# Patient Record
Sex: FEMALE
[De-identification: names, ages, dates, MRNs, and addresses within clinical notes are randomized per-mention and may not be internally consistent; named-entity substitution may affect disease eponyms.]

---

## 2020-06-12 ENCOUNTER — HOSPITAL ENCOUNTER (INPATIENT)
Dept: HOSPITAL 36 - GERO | Age: 85
LOS: 10 days | Discharge: NURSING, REHAB OR LONG-TERM CARE | DRG: 884 | End: 2020-06-22
Attending: PSYCHIATRY & NEUROLOGY | Admitting: PSYCHIATRY & NEUROLOGY
Payer: MEDICARE

## 2020-06-12 ENCOUNTER — HOSPITAL ENCOUNTER (EMERGENCY)
Dept: HOSPITAL 4 - SED | Age: 85
Discharge: PSYCHIATRIC OR SUBSTANCE USE FACILITY | End: 2020-06-12
Payer: COMMERCIAL

## 2020-06-12 VITALS — HEIGHT: 64 IN | BODY MASS INDEX: 26.46 KG/M2 | WEIGHT: 155 LBS

## 2020-06-12 VITALS — DIASTOLIC BLOOD PRESSURE: 73 MMHG | SYSTOLIC BLOOD PRESSURE: 157 MMHG

## 2020-06-12 VITALS — SYSTOLIC BLOOD PRESSURE: 143 MMHG

## 2020-06-12 VITALS — SYSTOLIC BLOOD PRESSURE: 152 MMHG

## 2020-06-12 DIAGNOSIS — F03.91: Primary | ICD-10-CM

## 2020-06-12 DIAGNOSIS — E78.5: ICD-10-CM

## 2020-06-12 DIAGNOSIS — F03.90: Primary | ICD-10-CM

## 2020-06-12 DIAGNOSIS — I10: ICD-10-CM

## 2020-06-12 DIAGNOSIS — F29: ICD-10-CM

## 2020-06-12 LAB
ALBUMIN SERPL BCP-MCNC: 3.5 G/DL (ref 3.4–4.8)
ALT SERPL W P-5'-P-CCNC: 20 U/L (ref 12–78)
AMPHETAMINES UR QL SCN: NEGATIVE
ANION GAP SERPL CALCULATED.3IONS-SCNC: 6 MMOL/L (ref 5–15)
APAP SERPL-MCNC: < 1 UG/ML (ref 1–30)
APPEARANCE UR: (no result)
AST SERPL W P-5'-P-CCNC: 11 U/L (ref 10–37)
BACTERIA URNS QL MICRO: (no result) /HPF
BARBITURATES UR QL SCN: NEGATIVE
BASOPHILS # BLD AUTO: 0.1 K/UL (ref 0–0.2)
BASOPHILS NFR BLD AUTO: 2 % (ref 0–2)
BENZODIAZ UR QL SCN: NEGATIVE
BILIRUB SERPL-MCNC: 0.3 MG/DL (ref 0–1)
BILIRUB UR QL STRIP: NEGATIVE
BUN SERPL-MCNC: 20 MG/DL (ref 8–21)
BZE UR QL SCN: NEGATIVE
CALCIUM SERPL-MCNC: 9.2 MG/DL (ref 8.4–11)
CANNABINOIDS UR QL SCN: NEGATIVE
CHLORIDE SERPL-SCNC: 107 MMOL/L (ref 98–107)
COLOR UR: YELLOW
CREAT SERPL-MCNC: 0.95 MG/DL (ref 0.55–1.3)
EOSINOPHIL # BLD AUTO: 0.1 K/UL (ref 0–0.4)
EOSINOPHIL NFR BLD AUTO: 1.2 % (ref 0–4)
ERYTHROCYTE [DISTWIDTH] IN BLOOD BY AUTOMATED COUNT: 15.1 % (ref 9–15)
ETHANOL SERPL-MCNC: < 3 MG/DL (ref ?–10)
GFR SERPL CREATININE-BSD FRML MDRD: (no result) ML/MIN (ref 90–?)
GLUCOSE SERPL-MCNC: 119 MG/DL (ref 70–99)
GLUCOSE UR STRIP-MCNC: NEGATIVE MG/DL
HCT VFR BLD AUTO: 40 % (ref 36–48)
HGB BLD-MCNC: 13.1 G/DL (ref 12–16)
HGB UR QL STRIP: NEGATIVE
KETONES UR STRIP-MCNC: (no result) MG/DL
LEUKOCYTE ESTERASE UR QL STRIP: (no result)
LYMPHOCYTES # BLD AUTO: 1.7 K/UL (ref 1–5.5)
LYMPHOCYTES NFR BLD AUTO: 24.7 % (ref 20.5–51.5)
MCH RBC QN AUTO: 26 PG (ref 27–31)
MCHC RBC AUTO-ENTMCNC: 33 % (ref 32–36)
MCV RBC AUTO: 81 FL (ref 79–98)
METHADONE UR-SCNC: NEGATIVE UMOL/L
METHAMPHET UR-SCNC: NEGATIVE UMOL/L
MONOCYTES # BLD MANUAL: 0.4 K/UL (ref 0–1)
MONOCYTES # BLD MANUAL: 5.4 % (ref 1.7–9.3)
NEUTROPHILS # BLD AUTO: 4.6 K/UL (ref 1.8–7.7)
NEUTROPHILS NFR BLD AUTO: 66.7 % (ref 40–70)
NITRITE UR QL STRIP: NEGATIVE
OPIATES UR QL SCN: NEGATIVE
OXYCODONE SERPL-MCNC: NEGATIVE NG/ML
PCP UR QL SCN: NEGATIVE
PH UR STRIP: 6.5 [PH] (ref 5–8)
PLATELET # BLD AUTO: 244 K/UL (ref 130–430)
POTASSIUM SERPL-SCNC: 3.9 MMOL/L (ref 3.5–5.1)
PROT UR QL STRIP: NEGATIVE
RBC # BLD AUTO: 4.98 MIL/UL (ref 4.2–6.2)
RBC #/AREA URNS HPF: (no result) /HPF (ref 0–3)
SODIUM SERPLBLD-SCNC: 142 MMOL/L (ref 136–145)
SP GR UR STRIP: 1.02 (ref 1–1.03)
TRICYCLICS UR-MCNC: NEGATIVE NG/ML
URINE PROPOXYPHENE SCREEN: NEGATIVE
UROBILINOGEN UR STRIP-MCNC: 0.2 MG/DL (ref 0.2–1)
WBC # BLD AUTO: 6.9 K/UL (ref 4.8–10.8)
WBC #/AREA URNS HPF: (no result) /HPF (ref 0–3)

## 2020-06-12 PROCEDURE — 80053 COMPREHEN METABOLIC PANEL: CPT

## 2020-06-12 PROCEDURE — 85025 COMPLETE CBC W/AUTO DIFF WBC: CPT

## 2020-06-12 PROCEDURE — 83036 HEMOGLOBIN GLYCOSYLATED A1C: CPT

## 2020-06-12 PROCEDURE — G0480 DRUG TEST DEF 1-7 CLASSES: HCPCS

## 2020-06-12 PROCEDURE — 93005 ELECTROCARDIOGRAM TRACING: CPT

## 2020-06-12 PROCEDURE — Z7610: HCPCS

## 2020-06-12 PROCEDURE — 36415 COLL VENOUS BLD VENIPUNCTURE: CPT

## 2020-06-12 PROCEDURE — 80307 DRUG TEST PRSMV CHEM ANLYZR: CPT

## 2020-06-12 PROCEDURE — G0481 DRUG TEST DEF 8-14 CLASSES: HCPCS

## 2020-06-12 PROCEDURE — G0410 GRP PSYCH PARTIAL HOSP 45-50: HCPCS

## 2020-06-12 PROCEDURE — 99285 EMERGENCY DEPT VISIT HI MDM: CPT

## 2020-06-12 PROCEDURE — G0482 DRUG TEST DEF 15-21 CLASSES: HCPCS

## 2020-06-12 PROCEDURE — 71045 X-RAY EXAM CHEST 1 VIEW: CPT

## 2020-06-12 PROCEDURE — 81000 URINALYSIS NONAUTO W/SCOPE: CPT

## 2020-06-12 PROCEDURE — 87081 CULTURE SCREEN ONLY: CPT

## 2020-06-12 PROCEDURE — 87086 URINE CULTURE/COLONY COUNT: CPT

## 2020-06-12 NOTE — NUR
Patient to be transferred to [place].  Is being transferred due to higher 
level of care.  Receiving facility has accepting physician and available space. 
ER physician has signed transfer form.  Patient or responsible party has agreed 
to transfer and signed form.  Patient belongings inventoried and will be sent 
with patient.  Copy of nursing notes, lab reports, EKG, Physicians Orders and 
X-rays to be sent with patient.  Report called to Iliana at receiving 
facility. Receiving physician is Dr Matthews. [facility] ambulance service has been 
called for transfer.

## 2020-06-12 NOTE — NUR
Pt came to ER for med clearance to be sent to [place]. Pt is in room resting 
comfortable, no distress, VSS.

## 2020-06-13 LAB
CHOLEST SERPL-MCNC: 313 MG/DL (ref ?–200)
HDLC SERPL-MCNC: 49 MG/DL (ref 23–92)
TRIGL SERPL-MCNC: 216 MG/DL (ref ?–150)

## 2020-06-13 NOTE — PSYCHIATRIC EVALUATION
DATE OF SERVICE:  06/13/2020



IDENTIFYING DATA:  The patient is an 85-year-old  woman living by

herself.  Information obtained by directly interviewing the patient as well as

reviewing the admission papers.



JUSTIFICATION OF HOSPITALIZATION:  The patient is admitted on a voluntary basis

in view of her acute psychosis, confusion and disorientation.



CHIEF COMPLAINT:  "I don't know, they brought me in here."



HISTORY OF PRESENT ILLNESS:  This is the first psychiatric hospitalization for

this patient who is reported to have been medicated with 5 mg 3 times a day of

the diazepam and 50 mg of the Seroquel at night time.  The patient is reporting

that she has been feeling very dizzy and has been losing control and has been

forgetting a lot.  The patient during the interview has been repeating the same

thing and has been very confused.  The patient has been having difficult time to

recall what type of work she was doing and then who she was living with, that

tells me that most of her family is in [place] in [place].  Sleep and appetite

prior to the hospitalization are reported to be fair.



PAST PSYCHIATRIC HISTORY:  The patient denies any prior psychiatric

hospitalizations or treatment.



MEDICAL HISTORY:  Physical examination is requested, done by Dr. Prasad.



SUBSTANCE ABUSE HISTORY:  None.



SOCIAL HISTORY:  The patient is living by herself.



PHYSICAL OR SEXUAL ABUSE HISTORY:  None.



LEGAL PROBLEMS:  None at this time.



MENTAL STATUS EXAMINATION:  The patient is an 85-year-old woman looking her

stated age, cooperative.  Eye contact is fair.  Speech is noted to be coherent,

but the patient goes on a tangent and the patient is acutely anxious, confused

and disoriented.  The patient is aware that she is in the hospital, but could

not figure it out why and how long she has been in here.  Attention span and

concentration are noted to be poor at this time.  Short-term memory is noted to

be very poor.  Long-term memory seems to be fair.  The patient is stating that

she is working with the  and could not figure it out where they have gone.

 The patient is not suicidal or homicidal at this time, but the patient has

paranoia, but denies any command hallucinations.  No visual hallucinations are

noted.  The patient appears to be of average intelligence.



DIAGNOSTIC IMPRESSION:

AXIS I:  Psychotic disorder, not otherwise specified.

AXIS IB:  Dementia and behavioral changes secondary trait.

AXIS II:  None.

AXIS III:  Hyperlipidemia, hypertension.



IMMEDIATE TREATMENT PLAN:  The patient's Seroquel is going to be decreased from

50 mg to 25 mg and the diazepam is going to be decreased from 5 mg 3 times a day

to 2 mg twice a day and the patient is going to be closely monitored.  I

encouraged her to verbalize the concerns rather than to act out.  Once

stabilized, the patient is going to be discharged to the skilled nursing

facility.





DD: 06/13/2020 09:24

DT: 06/13/2020 14:49

[facility]# [id number]  [id number]

## 2020-06-13 NOTE — HISTORY AND PHYSICAL
History of Present Illness





- HPI


Chief Complaint: 





Psychosis


HPI: 





* Patient transferred from [facility]


* Diagnoses with Acute Psychosis due to Advanced Dementia


Vital Signs: 





 Last Vital Signs











Temp  97.9 F   06/13/20 05:53


 


Pulse  62   06/13/20 08:27


 


Resp  18   06/13/20 05:53


 


BP  140/74   06/13/20 08:27


 


Pulse Ox  97   06/13/20 05:53














Past Medical History


Cardiovascular: Report: HTN, Hyperlipidemia


Pulmonary: Report: No Pertinent Hx


CNS: Report: Dementia


GI: Report: No Pertinent Hx


Psych: Report: Psychosis


Musculoskeletal: Report: No Pain


Rheumatologic: Report: No pertinent Hx


Infectious Disease: Report: No Pertinent Hx


Renal/: Report: No Pertinent Hx


Endocrine: Report: No Pertinent Hx


Dermatology: Report: No Pertinent Hx





- Past Surgical History


Past Surgical History: No pertinent Hx





Family Medical History





- Family Member


  ** Mother


History Unknown: Yes





Social History


Smoke: No


Alcohol: None


Drugs: None


Lives: Nursing Home





- Medications


Home Medications: 


Home Medication











 Medication  Instructions  Recorded  Type


 


Atorvastatin Calcium [Lipitor] PO HS 06/12/20 History


 


Diazepam [Valium] 5 mg PO TID 06/12/20 History


 


Losartan Potassium 50 mg PO 06/12/20 History


 


Metoprolol Tartrate [Lopressor] 50 mg PO Q12HR 06/12/20 History


 


QUEtiapine Fumarate [SEROquel] 50 mg PO HS 06/12/20 History














- Allergies


Allergies/Adverse Reactions: 


 Allergies











Allergy/AdvReac Type Severity Reaction Status Date / Time


 


No Known Allergies Allergy   Unverified 06/12/20 17:48














Review of Systems





- Review of Systems


Constitutional: Report: No Significant


Eyes: Report: No Significant


ENT: Report: No Significant


Respiratory: Report: No Significant


Cardiovascular: Report: No Significant


Gastrointestinal: Report: No Significant


Genitourinary: Report: No Significant


Musculoskeletal: Report: No Significant


Skin: Report: No Significant


Neurological: Report: No Significant





Physical Exam





- Physical Exam


HEENT: Report: Ears Nose Throat within normal limits, Pharnyx within normal 

limits


Neck: Report: Within normal limits


Cardiovascular Systems: Report: Regular, Rate and Rhythm, no murmurs noted


Respiratory: Report: Clear to Auscultation of lung fields, Breath Sounds are 

within normal limits


Abdomen: Report: Non-tender to palpation, Bowel Sounds are within normal limits


Back: Report: Inspection of back is within normal limits.


Extremities: Report: Non-tender to palpation., Patient had full range of motion

, No pedal edema was noted on inspection


Skin: Report: Color of skin is within normal limits, Warm, Dry, No Rashes noted 

of the skin


Neuro/Psych: Report: CN II-XII intact, No motor deficit, No sensory deficit, No 

new focal deficits





- Lab Results


All Lab Results last 24 hours: 





 Laboratory Results - last 24 hr











  06/12/20





  12:11


 


Triglycerides  216 H


 


Cholesterol  313 H


 


LDL Cholesterol Direct  216 H


 


HDL Cholesterol  49














- Assessment


Assessment: 





* Acute Psychosis


* Dementia


* Hypertension


* Hyperlipidemia





- Plan


Plan: 





* Admitted to Geropsych Unit


* Continue home meds


* Obtain Psych Consult


* Obtain labs





Cranial Nerve Assessment





- CRANIAL NERVES


alcohol swab:: Yes


Distinguishes movements in peripheral field.:: Yes


up, down, sideways:: Yes


on forehead, cheeks and chin, chews symmetrically:: Yes


FACIAL VII: upper: Frowns Symmetrically:: Yes


FACIAL VII: Lower: Smiles Symmetrically:: Yes


both ears:: Yes


GLOSS-PHARYNGEAL IX: Has gag reflex:: Yes


VAGUS X: Can make guttural sounds:: Yes


ACCESSORY XI: Shrugs shoulders symmetrically:: Yes


tremors or fasciculation's:: Yes





- MOTOR


spasticity, cogwheel, atrophy, tremor, asterixis, other: Yes





- COORDINATION


Finger to nose, heel to shin, THA, gait, Romberg: Yes





- SENSORY


signs, Brudzinski, Kernig, neck rigidity:: Yes





- REFLEXES


Brachioradials Right:: Yes


Brachioradials Left:: Yes


Biceps Right:: Yes


Biceps Left:: Yes


Triceps Right:: Yes


Triceps Left:: Yes


Knee Right:: Yes


Knee Left:: Yes


Ankle Right:: Yes


Ankle Left:: Yes


Babinski Right:: No


Babinski Left:: No

## 2020-06-14 LAB
ALT SERPL-CCNC: 17 U/L (ref 12–78)
ANION GAP SERPL CALC-SCNC: 10 MMOL/L (ref 5–15)
AST SERPL-CCNC: 12 U/L (ref 10–37)
BASOPHILS # BLD AUTO: 0 TH/CUMM (ref 0–0.2)
BASOPHILS NFR BLD AUTO: 0.4 % (ref 0–2)
BILIRUB SERPL-MCNC: 0.6 MG/DL (ref 0–1)
BUN SERPL-MCNC: 20 MG/DL (ref 8–21)
CALCIUM SERPL-MCNC: 9.6 MG/DL (ref 8.4–10.2)
CHLORIDE SERPL-SCNC: 105 MMOL/L (ref 98–107)
CO2 SERPL-SCNC: 30 MMOL/L (ref 23–29)
CREAT SERPL-MCNC: 0.84 MG/DL (ref 0.7–1.3)
EOSINOPHIL # BLD AUTO: 0.1 TH/CMM (ref 0.1–0.4)
EOSINOPHIL NFR BLD AUTO: 1.6 % (ref 0–5)
ERYTHROCYTE [DISTWIDTH] IN BLOOD BY AUTOMATED COUNT: 15 % (ref 11.5–20)
HCT VFR BLD CALC: 43.3 % (ref 41–60)
HGB BLD-MCNC: 13.8 GM/DL (ref 12–16)
LYMPHOCYTE AB SER FC-ACNC: 2.7 TH/CMM (ref 1.5–3)
LYMPHOCYTES NFR BLD AUTO: 38.9 % (ref 20–50)
MCH RBC QN AUTO: 25.9 PG (ref 27–31)
MCHC RBC AUTO-ENTMCNC: 31.9 PG (ref 28–36)
MCV RBC AUTO: 81.1 FL (ref 81–100)
MONOCYTES # BLD AUTO: 0.3 TH/CMM (ref 0.3–1)
MONOCYTES NFR BLD AUTO: 3.7 % (ref 2–10)
NEUTROPHILS # BLD: 3.8 TH/CMM (ref 1.8–8)
NEUTROPHILS NFR BLD AUTO: 55.4 % (ref 40–80)
PLATELET # BLD: 255 TH/CMM (ref 150–400)
POTASSIUM SERPL-SCNC: 4.1 MMOL/L (ref 3.5–5.1)
RBC # BLD AUTO: 5.34 MIL/UL (ref 4.2–6.2)
WBC # BLD AUTO: 6.9 K/UL (ref 4.8–10.8)

## 2020-06-14 NOTE — PROGRESS NOTES
DATE:  06/14/2020



SUBJECTIVE:  Staff was spoken to.  The patient is interviewed.  Mood is noted to

be depressed.  Affect is constricted.  The patient is very tearful and crying

this morning.  Insight and judgment at this time are noted to be still impaired.

 Impulse control is noted to be limited.  Coping skills are noted to be limited.

 The patient has been having difficult time to cope with the stress.  No side

effects to the medications are noted.  The patient has been presenting with

anxiety and is stating that she does not have any family over here.



ASSESSMENT:  The patient is still depressed and demented.



PLAN:  To continue the patient with the supportive therapy and start the patient

with the Lexapro 5 mg in the morning for her depression and closely monitor the

patient.





DD: 06/14/2020 10:42

DT: 06/14/2020 16:43

JOB# [id number]  [id number]

## 2020-06-14 NOTE — CONSULTATION
DATE OF CONSULTATION:     06/14/2020



TYPE OF CONSULTATION:  Psychology



HISTORY OF PRESENT ILLNESS:  The patient is an 85-year-old  

female.  The following is by review of the record as well as by the patient's

self-report.  The patient is being admitted on a voluntary basis due to

psychosis, confusion and disorientation.  According to record review, the

patient lives by herself; however, the record indicates the patient is . 

Upon interview, the patient states that she does not know why she was brought

into the hospital.  The patient does report that she had been getting dizzy. 

Staff reports the patient is confused and forgetful.  During the interview, the

patient continued to repeat the same response to different clinical questions.

She denied any suicidal ideation, plan or intention.



PAST MEDICAL HISTORY:  Please see history and physical by Dr. Prasad.



PAST PSYCHIATRIC HISTORY:  Records are unavailable.  Details are unknown.



SUBSTANCE ABUSE HISTORY:  The patient denied any history of alcohol, tobacco, or

drug use.



BRIEF PSYCHOSOCIAL HISTORY:  According to the patient, she is ; however,

the patient was unable to provide any information as to where she lives or who

she lives with.  The patient did not answer questions about having any children

or grandchildren.  The patient states that she is retired.  She did not answer

the questions about her previous occupational history or educational history. 

The patient did not answer questions about history of physical or sexual abuse

or current legal problems.  The patient states that she is a [employer], but not

devout.



MENTAL STATUS EXAMINATION:  The patient appears to be older than her stated 

age.  The patient's attitude is cooperative.  Eye contact is fair.  The patient 
is

reaching out with her hand touching or attempting to touch this writer.  Speech

is slow and soft.  The patient is responding somewhat coherently to the clinical

questions, but is confused.  Thought process shows to be tangential.  Mood is

euthymic.  Affect is broad.  The patient denies any suicidal ideation, plan or

intention.  She denied any auditory or visual hallucinations or any delusions. 

The patient does not know why she is being hospitalized.  The patient's behavior

has been redirectable.  Impulse control is intact.  Concentration is poor.  The

patient is unable to sustain focus and attention.  Short term memory is poor. 

Long-term memory seems to be poor.  Sensorium is alert and oriented to self and

place only.  The patient seems to be of average intelligence.  There are

apparent cognitive deficits with respect to memory and ability to concentrate. 

The patient is distractible.  The patient did not participate in the

interpretation of proverbs.  Insight is poor.  Judgment is compromised.



DIAGNOSTIC IMPRESSION:

AXIS I:

1.  Provisional diagnosis of psychotic disorder, not otherwise specified.

2.  Provisional diagnosis of dementia with behavioral disturbance.

AXIS II:  Deferred.

AXIS III:  Per Dr. Prasad.



TREATMENT PLAN:  The patient has been seen by Dr. Wynne for psychiatric

evaluation and for the management of the patient's psychotropic medications. 

According to the attending psychiatrist, the patient's Seroquel is being

decreased from 50 mg to 25 mg and the diazepam is going to be decreased from 5

mg 3 times a day to 2 mg a day.  We will provide reality orientation,

differentiation and integration.  We will provide coping strategies for phase of

life issues.  We will provide motivational enhancement for the patient to become

compliant and stay compliant with all aspects of her care and treatment.  The

patient's placement will be discussed with the attending psychiatrist, case

manager and the family.  Of note, the patient is being encouraged for possible

placement in a skilled nursing facility versus returning home.  We will follow

up in 2-3 days to continue the present treatment if the patient remains admitted

on the unit and is able to benefit from psychology services.



Thank you, Dr. Wynne for this consult and the opportunity to participate

with you in this patient's care.





DD: 06/14/2020 11:38

DT: 06/14/2020 12:13

JOB# [id number]  [id number]

LETI

## 2020-06-15 RX ADMIN — ESCITALOPRAM SCH MG: 5 TABLET, FILM COATED ORAL at 08:34

## 2020-06-15 NOTE — PROGRESS NOTES
DATE:  06/15/2020



SUBJECTIVE:  Staff was spoken to.  The patient is interviewed.  Mood is noted to

be anxious and depressed.  Affect is constricted.  Insight and judgment at this

time are noted to be still impaired.  Impulse control is noted to be limited. 

Coping skills are noted to be limited.  The patient has been having difficult

time to cope with the stress.  The patient was initially on 5 mg 3 times a day

of Valium.  I am trying to slowly taper the Valium; although the patient is

currently on 2 mg in the morning.  No side effects are noted.  The patient is

still depressed and confused.  The patient is currently placed on Lexapro.  The

patient has been able to tolerate the medications.  The patient has no place to

return to.  The patient is also very forgetful at this time.



PLAN:  To continue the patient with the supportive therapy and continue current

medications.





DD: 06/15/2020 08:26

DT: 06/15/2020 11:45

JOB# [id number]  [id number]

## 2020-06-15 NOTE — INTERNAL MEDICINE PROG NOTE
Internal Medicine Subjective





- Subjective


Service Date: 06/15/20


Patient seen and examined:: without staff


Patient is:: awake


Per staff patient has:: no adverse event, no episodes of fall





Internal Medicine Objective





- Results


Result Diagrams: 


 06/14/20 09:49





 06/14/20 09:49


Recent Labs: 


 Laboratory Last Values











WBC  6.9 K/uL (4.8-10.8)   06/14/20  09:49    


 


RBC  5.34 MIL/uL (4.2-6.2)   06/14/20  09:49    


 


Hgb  13.8 gm/dL (12-16)   06/14/20  09:49    


 


Hct  43.3 % (41.0-60)   06/14/20  09:49    


 


MCV  81.1 fl ()   06/14/20  09:49    


 


MCH  25.9 pg (27.0-31.0)  L  06/14/20  09:49    


 


MCHC Differential  31.9 pg (28.0-36.0)   06/14/20  09:49    


 


RDW  15.0 % (11.5-20.0)   06/14/20  09:49    


 


Plt Count  255 Th/cmm (150-400)   06/14/20  09:49    


 


MPV  8.4 fl  06/14/20  09:49    


 


Neutrophils %  55.4 % (40.0-80.0)   06/14/20  09:49    


 


Lymphocytes %  38.9 % (20.0-50.0)   06/14/20  09:49    


 


Monocytes %  3.7 % (2.0-10.0)   06/14/20  09:49    


 


Eosinophils %  1.6 % (0.0-5.0)   06/14/20  09:49    


 


Basophils %  0.4 % (0.0-2.0)   06/14/20  09:49    


 


Sodium  141 mmol/L (136-145)   06/14/20  09:49    


 


Potassium  4.1 mmol/L (3.5-5.1)   06/14/20  09:49    


 


Chloride  105 mmol/L ()   06/14/20  09:49    


 


Carbon Dioxide  30 mmol/L (23-29)  H  06/14/20  09:49    


 


Anion Gap  10  (5-15)   06/14/20  09:49    


 


BUN  20 mg/dL (8-21)   06/14/20  09:49    


 


Creatinine  0.84 mg/dL (0.70-1.30)   06/14/20  09:49    


 


Glucose  134 mg/dL (70-99)  H  06/14/20  09:49    


 


Calcium  9.6 mg/dL (8.4-10.2)   06/14/20  09:49    


 


Total Bilirubin  0.6 mg/dL (0.0-1.0)   06/14/20  09:49    


 


AST  12 U/L (10-37)   06/14/20  09:49    


 


ALT  17 U/L (12-78)   06/14/20  09:49    


 


Alkaline Phosphatase  69 U/L ()   06/14/20  09:49    


 


Total Protein  7.2 g/dL (6.4-8.3)   06/14/20  09:49    


 


Albumin  3.7 g/dL (3.4-5.0)   06/14/20  09:49    


 


Triglycerides  216 mg/dL (<150)  H  06/12/20  12:11    


 


Cholesterol  313 mg/dL (<200)  H  06/12/20  12:11    


 


LDL Cholesterol Direct  216 mg/dL ()  H  06/12/20  12:11    


 


HDL Cholesterol  49 mg/dL (23-92)   06/12/20  12:11    














- Physical Exam


Vitals and I&O: 


 Vital Signs











Temp  97.5 F   06/15/20 06:41


 


Pulse  65   06/15/20 08:31


 


Resp  19   06/15/20 06:41


 


BP  134/70   06/15/20 08:31


 


Pulse Ox  97   06/15/20 06:41








 Intake & Output











 06/14/20 06/15/20 06/15/20





 18:59 06:59 18:59


 


Intake Total  120 


 


Balance  120 


 


Intake:   


 


  Oral  120 


 


Other:   


 


  # Voids  1 


 


  # Bowel Movements  0 











Active Medications: 


Current Medications





Acetaminophen (Tylenol)  650 mg PO Q4H PRN


   PRN Reason: Pain (Mild 1-3)


   Stop: 08/11/20 17:51


Al Hydrox/Mg Hydrox/Simethicone (Maalox)  30 ml PO Q4HR PRN


   PRN Reason: GI DISTRESS


   Stop: 08/11/20 17:51


Atorvastatin Calcium (Lipitor)  20 mg PO HS HOWARD


   Stop: 08/11/20 20:59


   Last Admin: 06/14/20 21:02 Dose:  20 mg


Diazepam (Valium)  2 mg PO DAILY HOWARD; Protocol


   Stop: 08/14/20 08:59


   Last Admin: 06/15/20 08:34 Dose:  2 mg


Escitalopram Oxalate (Lexapro)  5 mg PO DAILY HOWARD; Protocol


   Stop: 08/14/20 08:59


   Last Admin: 06/15/20 08:34 Dose:  5 mg


Lorazepam (Ativan)  0.5 mg PO Q4HR PRN; Protocol


   PRN Reason: Anxiety


   Stop: 07/12/20 17:51


Losartan Potassium (Cozaar)  50 mg PO DAILY HOWARD


   Stop: 08/12/20 08:59


   Last Admin: 06/15/20 08:29 Dose:  50 mg


Magnesium Hydroxide (Milk Of Magnesia)  30 ml PO HS PRN


   PRN Reason: Constipation


Metoprolol Tartrate (Lopressor)  50 mg PO Q12HR HOWARD


   Stop: 08/11/20 20:59


   Last Admin: 06/15/20 08:31 Dose:  50 mg


Multivitamins/Vitamin C (Theragran)  1 tab PO DAILY HOWARD


   Stop: 08/12/20 08:59


   Last Admin: 06/15/20 08:32 Dose:  1 tab


Quetiapine Fumarate (Seroquel)  25 mg PO HS HOWARD; Protocol


   Stop: 08/11/20 20:59


   Last Admin: 06/14/20 21:03 Dose:  25 mg








General: weak


HEENT: NC/AT, PERRLA


Neck: Supple, No JVD


Lungs: CTAB


Cardiovascular: RRR, Normal S1, Normal S2


Abdomen: soft, non-tender


Extremities: clear





Internal Medicine Assmt/Plan





- Assessment


Assessment: 





1.  Dementia


2.  HTN


3.  HLD





- Plan


Plan: 





continue BP meds


continue supportive care


reviewed medical records


d/w r.n.

## 2020-06-16 RX ADMIN — ESCITALOPRAM SCH MG: 5 TABLET, FILM COATED ORAL at 08:10

## 2020-06-16 NOTE — PROGRESS NOTES
DATE:     06/15/2020



PSYCHOLOGY PROGRESS NOTE



SUBJECTIVE:  The patient is seen in her room, sitting on the edge of her bed. 

The patient presents as friendly and interactive.  Case is discussed with staff.

The patient appears to be somewhat anxious about her hospitalization.  The

patient denied any hopelessness or helplessness.  However, the patient feels she

does not need to be hospitalized.  Staff reports the patient is compliant with

her medication.  The patient presents with periods of confusion and is

forgetful.



OBJECTIVE:  Mood is anxious.  Affect is mood congruent and reactive.  Thought

process shows to be linear and concrete with periods of confusion.  The patient

denied any suicidal ideation, plan or intention.  She denies any hallucinations

or delusions.  The patient is requesting to be discharged and asked repeatedly

when she will be discharged.  This is deferred to the attending psychiatrist.

The patient's behavior has been compliant with her medication and redirectable.



ASSESSMENT:  The patient's impulse control is limited, but improving.  The

patient's confusion as well as anxiety and moderate depression persist.



PLAN:  The patient is continued with supportive psychotherapy, which included

reality orientation, differentiation and integration.  We provided coping

strategies for phase of life issues as well.  According to the attending

psychiatrist, the patient is being slowly tapered from the Valium.  We will

monitor the patient closely.  We will continue to provide remotivation for the

patient to stay compliant with all aspects of her care and treatment as well as

to accept possible placement in a skilled nursing facility versus returning

home.  This outcome will be discussed with the attending psychiatrist, case

manager and with the family.  We will follow up in 2-3 days to continue the 
present

treatment if the patient remains admitted on the unit.





DD: 06/16/2020 09:34

DT: 06/16/2020 10:23

JOB# [id number]  [id number]

LETI

## 2020-06-16 NOTE — INTERNAL MEDICINE PROG NOTE
Internal Medicine Subjective





- Subjective


Service Date: 20


Patient seen and examined:: without staff


Patient is:: awake


Per staff patient has:: no adverse event, no episodes of fall





Internal Medicine Objective





- Results


Result Diagrams: 


 20 09:49





 20 09:49


Recent Labs: 


 Laboratory Last Values











WBC  6.9 K/uL (4.8-10.8)   20  09:49    


 


RBC  5.34 MIL/uL (4.2-6.2)   20  09:49    


 


Hgb  13.8 gm/dL (12-16)   20  09:49    


 


Hct  43.3 % (41.0-60)   20  09:49    


 


MCV  81.1 fl ()   20  09:49    


 


MCH  25.9 pg (27.0-31.0)  L  20  09:49    


 


MCHC Differential  31.9 pg (28.0-36.0)   20  09:49    


 


RDW  15.0 % (11.5-20.0)   20  09:49    


 


Plt Count  255 Th/cmm (150-400)   20  09:49    


 


MPV  8.4 fl  20  09:49    


 


Neutrophils %  55.4 % (40.0-80.0)   20  09:49    


 


Lymphocytes %  38.9 % (20.0-50.0)   20  09:49    


 


Monocytes %  3.7 % (2.0-10.0)   20  09:49    


 


Eosinophils %  1.6 % (0.0-5.0)   20  09:49    


 


Basophils %  0.4 % (0.0-2.0)   20  09:49    


 


Sodium  141 mmol/L (136-145)   20  09:49    


 


Potassium  4.1 mmol/L (3.5-5.1)   20  09:49    


 


Chloride  105 mmol/L ()   20  09:49    


 


Carbon Dioxide  30 mmol/L (23-29)  H  20  09:49    


 


Anion Gap  10  (5-15)   20  09:49    


 


BUN  20 mg/dL (8-21)   20  09:49    


 


Creatinine  0.84 mg/dL (0.70-1.30)   20  09:49    


 


Glucose  134 mg/dL (70-99)  H  20  09:49    


 


Calcium  9.6 mg/dL (8.4-10.2)   20  09:49    


 


Total Bilirubin  0.6 mg/dL (0.0-1.0)   20  09:49    


 


AST  12 U/L (10-37)   20  09:49    


 


ALT  17 U/L (12-78)   20  09:49    


 


Alkaline Phosphatase  69 U/L ()   20  09:49    


 


Total Protein  7.2 g/dL (6.4-8.3)   20  09:49    


 


Albumin  3.7 g/dL (3.4-5.0)   20  09:49    


 


Triglycerides  216 mg/dL (<150)  H  20  12:11    


 


Cholesterol  313 mg/dL (<200)  H  20  12:11    


 


LDL Cholesterol Direct  216 mg/dL ()  H  20  12:11    


 


HDL Cholesterol  49 mg/dL (23-92)   20  12:11    














- Physical Exam


Vitals and I&O: 


 Vital Signs











Temp  97.2 F   20 06:07


 


Pulse  68   20 08:09


 


Resp  18   20 06:07


 


BP  134/56   20 08:09


 


Pulse Ox  97   20 06:07








 Intake & Output











 06/15/20 06/16/20 06/16/20





 18:59 06:59 18:59


 


Intake Total 1000 240 


 


Balance 1000 240 


 


Intake:   


 


  Oral 1000 240 


 


Other:   


 


  # Voids 4 2 


 


  # Bowel Movements 1  











Active Medications: 


Current Medications





Acetaminophen (Tylenol)  650 mg PO Q4H PRN


   PRN Reason: Pain (Mild 1-3)


   Stop: 20 17:51


Al Hydrox/Mg Hydrox/Simethicone (Maalox)  30 ml PO Q4HR PRN


   PRN Reason: GI DISTRESS


   Stop: 20 17:51


Atorvastatin Calcium (Lipitor)  20 mg PO HS [facility]


   Stop: 20 20:59


   Last Admin: 06/15/20 21:03 Dose:  20 mg


Diazepam (Valium)  2 mg PO DAILY [facility]; Protocol


   Stop: 20 08:59


   Last Admin: 20 08:10 Dose:  2 mg


Donepezil HCl (Aricept)  5 mg PO DAILY [facility]


   Stop: 20 08:59


Escitalopram Oxalate (Lexapro)  5 mg PO DAILY [facility]; Protocol


   Stop: 20 08:59


   Last Admin: 20 08:10 Dose:  5 mg


Lorazepam (Ativan)  0.5 mg PO Q4HR PRN; Protocol


   PRN Reason: Anxiety


   Stop: 20 17:51


Losartan Potassium (Cozaar)  50 mg PO DAILY [facility]


   Stop: 20 08:59


   Last Admin: 20 08:09 Dose:  50 mg


Magnesium Hydroxide (Milk Of Magnesia)  30 ml PO HS PRN


   PRN Reason: Constipation


Memantine (Namenda)  5 mg PO BID [facility]


   Stop: 08/15/20 16:59


Metoprolol Tartrate (Lopressor)  50 mg PO Q12HR [facility]


   Stop: 20 20:59


   Last Admin: 20 08:08 Dose:  50 mg


Multivitamins/Vitamin C (Theragran)  1 tab PO DAILY [facility]


   Stop: 20 08:59


   Last Admin: 20 08:08 Dose:  1 tab


Quetiapine Fumarate (Seroquel)  25 mg PO HS [facility]; Protocol


   Stop: 20 20:59


   Last Admin: 06/15/20 21:05 Dose:  25 mg








General: weak


HEENT: NC/AT, PERRLA


Neck: Supple, No JVD


Lungs: CTAB


Cardiovascular: RRR, Normal S1, Normal S2


Abdomen: soft, non-tender


Extremities: clear


Neurological: no change





Internal Medicine Assmt/Plan





- Assessment


Assessment: 





1.  HTN


2.  HLD


3.  Dementia





- Plan


Plan: 





continue BP meds


continue supportive care


reviewed medical records


d/w r.n.





Nutritional Asmnt/Malnutr-PDOC





- Dietary Evaluation


Malnutrition Findings (Please click <Entered> for more info): 








Nutritional Asmnt/Malnutrition                             Start:  20 11:

47


Text:                                                      Status: Complete    

  


Freq:                                                                          

  


Protocol:                                                                      

  


 Document     20 11:47  GRECIA  (Rec: 20 11:49  RIPLIONELCLEO [email or web address])


 Nutritional Asmnt/Malnutrition


     Patient General Information


      Nutritional Screening                      Moderate Risk


      Diagnosis                                  Acute Psychosis associated


                                                 with Advanced Dementia


      Pertinent Medical Hx/Surgical Hx           HTN, Hyperlipidemia, Dementia


      Subjective Information                     Pt is a 85-year-old female


                                                 admitted from living by


                                                 herself on  d/t acute


                                                 psychosis associated with


                                                 advanced dementia. Pt is


                                                 eating an estimated 100% of


                                                 meals Per Meal/Nutrition


                                                 Activity Record. Dietary is


                                                 currently providing an


                                                 estimated 2150 kcals and 108


                                                 gm Pro to meet 100+% kcal and


                                                 100+% Pro needs.


                                                 Anthropometrics


                                                 HT: 53


                                                 WT: 144 LB (65.45 kg)


                                                 BMI: 25.58 (Overweight)


                                                 GI/ Skin Integrity


                                                 GI: WNL, Soft, Non-tender


                                                 BM: 6/15 x1


                                                 I/O: 1240/Not Noted


                                                 Skin: WNL, Intact


                                                 Navid: 21


                                                 Diet Order: Cardiac


                                                 Estimated Energy Needs: (


                                                 Geriatric, CBW)


                                                 5411-0394 kcals (25-30 kcals/


                                                 kg)


                                                 65-80g Pro (1.0-1.2 g/kg)


                                                 4634-7852 ml (25-30 ml/kg)


      Current Diet Order/ Nutrition Support      Cardiac


      Pertinent Medications                      Maalox, Lipitor, Valium,


                                                 Cozaar, MOM (PRN), Theragran


      Pertinent Labs                             : Glucose 134, Cholesterol


                                                 313, Triglycerides 216, LDL


                                                 216


                                                 : Glucose 119, Urine


                                                 Ketones- trace


     Nutritional Hx/Data


      Height                                     1.6 m


      Height (Calculated Centimeters)            160.0


      Current Weight (lbs)                       65.317 kg


      Weight (Calculated Kilograms)              65.3


      Weight (Calculated Grams)                  89194.3


      Ideal Body Weight                          115 LB (52.27 kg)


      % Ideal Body Weight                        125


      Body Mass Index (BMI)                      25.4


      Weight Status                              Overweight


     GI Symptoms


      Last BM                                    6/15 x1


      Skin Integrity/Comment:                    Skin: WNL, Intact


                                                 Navid: 21


      Current %PO                                Good (%)


     Estimated Nutritional Goals


      BEE in Kcals:                              Using Current wt


      Calories/Kcals/Kg                          25-30


      Kcals Calculated                           4586-1345


      Protein:                                   Using Current wt


      Protein g/k.0-1.2


      Protein Calculated                         65-80


      Fluid: ml                                  7028-3620 ml (25-30 ml/kg)


     Nutritional Problem


      No current Nutrition Prob


       Problem                                   No nutrition diagnosis at this


                                                 time.


       Etiology                                  N/A


       Signs/Symptoms:                           N/A


     Malnutrition Related to Morbid Obesity


      Malnutrition related to morbid obesity     No


     Intervention/Recommendation


      Comments                                   Continue Cardiac diet as


                                                 tolerated.


     Expected Outcomes/Goals


      Expected Outcomes/Goals                    1.PO intake to continue to


                                                 meet >75% of estimated


                                                 nutritional needs.


                                                 2.Monitor PO intake, wt,


                                                 nutrition related labs, and


                                                 skin integrity.


                                                 3.F/U as low risk in 7-10 days


                                                 , -.

## 2020-06-16 NOTE — PROGRESS NOTES
DATE:  06/16/2020



SUBJECTIVE:  Staff was spoken to.  The patient is interviewed.  Mood is noted to

be irritable.  Affect is constricted.  Insight and judgment at this time are

noted to be still impaired.  Impulse control is noted to be limited.  The

patient is isolative and withdrawn.  The patient is pacing most of the time. 

The patient has poor short and long-term memory.  The patient is not able to

care for self.



ASSESSMENT:  The patient is demented and depressed.



PLAN:  To continue the patient with the Lexapro and add Namenda and Aricept to

the current medications and follow the patient.





DD: 06/16/2020 09:39

DT: 06/16/2020 14:46

JOB# [id number]  [id number]

## 2020-06-17 RX ADMIN — ESCITALOPRAM SCH MG: 5 TABLET, FILM COATED ORAL at 09:05

## 2020-06-17 NOTE — PROGRESS NOTES
DATE:  06/17/2020



SUBJECTIVE:  Staff was spoken to.  The patient is interviewed.  Mood is noted to

be depressed.  Affect is constricted.  The patient's pulse rate is coming low

and I have decided to discontinue the diazepam and then use the Ativan only on a

p.r.n. basis.  The patient is currently on lorazepam 0.5 mg every 6 hours p.r.n.

and the patient is also receiving the Seroquel 25 mg at bedtime and that is also

going to be changed to 12.5 mg and the patient is going to be closely monitored.



ASSESSMENT:  The patient is still confused and disoriented.



PLAN:  To continue the patient with current medications.  I encouraged the

patient to verbalize the concerns rather than to act out.





DD: 06/17/2020 08:15

DT: 06/17/2020 13:56

JOB# [id number]  [id number]

## 2020-06-17 NOTE — INTERNAL MEDICINE PROG NOTE
Internal Medicine Subjective





- Subjective


Service Date: 20


Patient is:: awake


Per staff patient has:: no adverse event, no episodes of fall





Internal Medicine Objective





- Results


Result Diagrams: 


 20 09:49





 20 09:49


Recent Labs: 


 Laboratory Last Values











WBC  6.9 K/uL (4.8-10.8)   20  09:49    


 


RBC  5.34 MIL/uL (4.2-6.2)   20  09:49    


 


Hgb  13.8 gm/dL (12-16)   20  09:49    


 


Hct  43.3 % (41.0-60)   20  09:49    


 


MCV  81.1 fl ()   20  09:49    


 


MCH  25.9 pg (27.0-31.0)  L  20  09:49    


 


MCHC Differential  31.9 pg (28.0-36.0)   20  09:49    


 


RDW  15.0 % (11.5-20.0)   20  09:49    


 


Plt Count  255 Th/cmm (150-400)   20  09:49    


 


MPV  8.4 fl  20  09:49    


 


Neutrophils %  55.4 % (40.0-80.0)   20  09:49    


 


Lymphocytes %  38.9 % (20.0-50.0)   20  09:49    


 


Monocytes %  3.7 % (2.0-10.0)   20  09:49    


 


Eosinophils %  1.6 % (0.0-5.0)   20  09:49    


 


Basophils %  0.4 % (0.0-2.0)   20  09:49    


 


Sodium  141 mmol/L (136-145)   20  09:49    


 


Potassium  4.1 mmol/L (3.5-5.1)   20  09:49    


 


Chloride  105 mmol/L ()   20  09:49    


 


Carbon Dioxide  30 mmol/L (23-29)  H  20  09:49    


 


Anion Gap  10  (5-15)   20  09:49    


 


BUN  20 mg/dL (8-21)   20  09:49    


 


Creatinine  0.84 mg/dL (0.70-1.30)   20  09:49    


 


Glucose  134 mg/dL (70-99)  H  20  09:49    


 


Calcium  9.6 mg/dL (8.4-10.2)   20  09:49    


 


Total Bilirubin  0.6 mg/dL (0.0-1.0)   20  09:49    


 


AST  12 U/L (10-37)   20  09:49    


 


ALT  17 U/L (12-78)   20  09:49    


 


Alkaline Phosphatase  69 U/L ()   20  09:49    


 


Total Protein  7.2 g/dL (6.4-8.3)   20  09:49    


 


Albumin  3.7 g/dL (3.4-5.0)   20  09:49    


 


Triglycerides  216 mg/dL (<150)  H  20  12:11    


 


Cholesterol  313 mg/dL (<200)  H  20  12:11    


 


LDL Cholesterol Direct  216 mg/dL ()  H  20  12:11    


 


HDL Cholesterol  49 mg/dL (23-92)   20  12:11    














- Physical Exam


Vitals and I&O: 


 Vital Signs











Temp  98.4 F   20 14:00


 


Pulse  58   20 16:47


 


Resp  16   20 16:47


 


BP  130/81   20 16:47


 


Pulse Ox  98   20 16:47








 Intake & Output











 20





 18:59 06:59 18:59


 


Intake Total 950 120 800


 


Balance 950 120 800


 


Intake:   


 


  Oral 950 120 800


 


Other:   


 


  # Voids 3 2 4


 


  # Bowel Movements 1 0 0











Active Medications: 


Current Medications





Acetaminophen (Tylenol)  650 mg PO Q4H PRN


   PRN Reason: Pain (Mild 1-3)


   Stop: 20 17:51


   Last Admin: 20 16:06 Dose:  650 mg


Al Hydrox/Mg Hydrox/Simethicone (Maalox)  30 ml PO Q4HR PRN


   PRN Reason: GI DISTRESS


   Stop: 20 17:51


Atorvastatin Calcium (Lipitor)  20 mg PO HS [facility]


   Stop: 20 20:59


   Last Admin: 20 20:51 Dose:  20 mg


Donepezil HCl (Aricept)  5 mg PO DAILY [facility]


   Stop: 20 08:59


   Last Admin: 20 09:05 Dose:  5 mg


Escitalopram Oxalate (Lexapro)  5 mg PO DAILY [facility]; Protocol


   Stop: 20 08:59


   Last Admin: 20 09:05 Dose:  5 mg


Lorazepam (Ativan)  0.5 mg PO Q4HR PRN; Protocol


   PRN Reason: Anxiety


   Stop: 20 17:51


   Last Admin: 20 16:52 Dose:  0.5 mg


Losartan Potassium (Cozaar)  50 mg PO DAILY Counts include 234 beds at the [facility]


   Stop: 20 08:59


   Last Admin: 20 09:05 Dose:  Not Given


Magnesium Hydroxide (Milk Of Magnesia)  30 ml PO HS PRN


   PRN Reason: Constipation


Memantine (Namenda)  5 mg PO BID [facility]


   Stop: 08/15/20 16:59


   Last Admin: 20 16:52 Dose:  5 mg


Metoprolol Tartrate (Lopressor)  50 mg PO Q12HR [facility]


   Stop: 20 20:59


   Last Admin: 20 09:06 Dose:  Not Given


Multivitamins/Vitamin C (Theragran)  1 tab PO DAILY [facility]


   Stop: 20 08:59


   Last Admin: 20 09:05 Dose:  1 tab


Quetiapine Fumarate (Seroquel)  12.5 mg PO HS Counts include 234 beds at the [facility]; Protocol


   Stop: 20 20:59








General: weak


HEENT: NC/AT, PERRLA


Neck: Supple, No JVD


Lungs: CTAB


Cardiovascular: RRR, Normal S1, Normal S2


Abdomen: soft, non-tender


Extremities: clear


Neurological: no change





Internal Medicine Assmt/Plan





- Assessment


Assessment: 





1.  HTN


2.  HLD


3.  Dementia





- Plan


Plan: 





continue cozaar and lopressor


continue supportive care


reviewed medical records


d/w r.n.





Nutritional Asmnt/Malnutr-PDOC





- Dietary Evaluation


Malnutrition Findings (Please click <Entered> for more info): 








Nutritional Asmnt/Malnutrition                             Start:  20 11:

47


Text:                                                      Status: Complete    

  


Freq:                                                                          

  


Protocol:                                                                      

  


 Document     20 11:47  GRECIA  (Rec: 20 11:49  GRECIA  [id number])


 Nutritional Asmnt/Malnutrition


     Patient General Information


      Nutritional Screening                      Moderate Risk


      Diagnosis                                  Acute Psychosis associated


                                                 with Advanced Dementia


      Pertinent Medical Hx/Surgical Hx           HTN, Hyperlipidemia, Dementia


      Subjective Information                     Pt is a 85-year-old female


                                                 admitted from living by


                                                 herself on  d/t acute


                                                 psychosis associated with


                                                 advanced dementia. Pt is


                                                 eating an estimated 100% of


                                                 meals Per Meal/Nutrition


                                                 Activity Record. Dietary is


                                                 currently providing an


                                                 estimated 2150 kcals and 108


                                                 gm Pro to meet 100+% kcal and


                                                 100+% Pro needs.


                                                 Anthropometrics


                                                 HT: 53


                                                 WT: 144 LB (65.45 kg)


                                                 BMI: 25.58 (Overweight)


                                                 GI/ Skin Integrity


                                                 GI: WNL, Soft, Non-tender


                                                 BM: 6/15 x1


                                                 I/O: 1240/Not Noted


                                                 Skin: WNL, Intact


                                                 Navid: 21


                                                 Diet Order: Cardiac


                                                 Estimated Energy Needs: (


                                                 Geriatric, CBW)


                                                 4557-3306 kcals (25-30 kcals/


                                                 kg)


                                                 65-80g Pro (1.0-1.2 g/kg)


                                                 5387-9609 ml (25-30 ml/kg)


      Current Diet Order/ Nutrition Support      Cardiac


      Pertinent Medications                      Maalox, Lipitor, Valium,


                                                 Cozaar, MOM (PRN), Theragran


      Pertinent Labs                             : Glucose 134, Cholesterol


                                                 313, Triglycerides 216, LDL


                                                 216


                                                 : Glucose 119, Urine


                                                 Ketones- trace


     Nutritional Hx/Data


      Height                                     1.6 m


      Height (Calculated Centimeters)            160.0


      Current Weight (lbs)                       65.317 kg


      Weight (Calculated Kilograms)              65.3


      Weight (Calculated Grams)                  85819.3


      Ideal Body Weight                          115 LB (52.27 kg)


      % Ideal Body Weight                        125


      Body Mass Index (BMI)                      25.4


      Weight Status                              Overweight


     GI Symptoms


      Last BM                                    6/15 x1


      Skin Integrity/Comment:                    Skin: WNL, Intact


                                                 Navid: 21


      Current %PO                                Good (%)


     Estimated Nutritional Goals


      BEE in Kcals:                              Using Current wt


      Calories/Kcals/Kg                          25-30


      Kcals Calculated                           5769-7551


      Protein:                                   Using Current wt


      Protein g/k.0-1.2


      Protein Calculated                         65-80


      Fluid: ml                                  3605-3672 ml (25-30 ml/kg)


     Nutritional Problem


      No current Nutrition Prob


       Problem                                   No nutrition diagnosis at this


                                                 time.


       Etiology                                  N/A


       Signs/Symptoms:                           N/A


     Malnutrition Related to Morbid Obesity


      Malnutrition related to morbid obesity     No


     Intervention/Recommendation


      Comments                                   Continue Cardiac diet as


                                                 tolerated.


     Expected Outcomes/Goals


      Expected Outcomes/Goals                    1.PO intake to continue to


                                                 meet >75% of estimated


                                                 nutritional needs.


                                                 2.Monitor PO intake, wt,


                                                 nutrition related labs, and


                                                 skin integrity.


                                                 3.F/U as low risk in 7-10 days


                                                 , -.

## 2020-06-18 LAB — HBA1C BLD-MCNC: 6.1 G/DL

## 2020-06-18 RX ADMIN — ESCITALOPRAM SCH MG: 5 TABLET, FILM COATED ORAL at 08:42

## 2020-06-18 NOTE — PROGRESS NOTES
DATE:     06/17/2020



PSYCHOLOGY PROGRESS NOTE



SUBJECTIVE:  The patient is seen and is interviewed.  Case is discussed with

staff.  The patient is in the activity/dining area.  The patient presents as

friendly and cooperative.  Speech is soft and quiet.  The patient is mumbling, 

but responding to the clinical questions at times coherently and at other times

confused and irrelevantly.  The patient states that she is waiting to be 
returned 

home and is asking when will she be discharged.



OBJECTIVE:  Mood is dysphoric.  Affect is mood congruent.  Thought process 

shows to be confused with periods of clarity.  The patient denied any 
hallucinations 

or delusions.  She denies any suicidal thoughts.  The patient's behavior has 
been 

compliant with medication and treatment.



ASSESSMENT:  The patient's confusion and disorientation persists.



PLAN:  The patient is continued on her current medications.  The attending

psychiatrist reports the patient is being discontinued on routine diazepam and

that Ativan will be on a p.r.n. basis.  Also, Seroquel 25 mg at bedtime is being

changed to 12.5 mg.  The patient will be closely monitored.  We provided reality

orientation, differentiation and integration.  We provided coping strategies 
for 

phase of life issues.  This writer discussed with the patient the possible 
placement 

in a skilled nursing facility versus returning home.  The patient is fixated on

returning home versus placement.  This will be discussed with the attending

psychiatrist as well as the  and the patient's daughter who is

involved in her care.  According to the 's notes, the patient's

daughter was contacted and there was an agreement for possible placement for the

patient at [facility] when the patient has stabilized.  We

will follow up in 2-3 days to continue the present treatment if the patient

remains on the unit and is able to benefit from psychology services.





DD: 06/18/2020 09:54

DT: 06/18/2020 11:19

JOB# [id number]  [id number]

LETI

## 2020-06-18 NOTE — PROGRESS NOTES
DATE:  06/18/2020



PSYCHIATRIC PROGRESS NOTE



SUBJECTIVE:  Staff was spoken to.  The patient is interviewed.  Mood is noted to

be anxious.  Insight and judgment at this time are noted to be still impaired. 

Impulse control is noted to be limited.  The patient is very intrusive and has

been pacing on the unit.  The patient is still confused and depressed.  The

patient denies any command hallucinations.  No visual hallucinations are noted.



ASSESSMENT:  The patient is still depressed and demented.



PLAN:  To continue the patient with the current medications and await for

placement of this patient.





DD: 06/18/2020 08:24

DT: 06/18/2020 11:20

JOB# [id number]  [id number]

## 2020-06-19 RX ADMIN — ESCITALOPRAM SCH MG: 5 TABLET, FILM COATED ORAL at 08:38

## 2020-06-19 NOTE — INTERNAL MEDICINE PROG NOTE
Internal Medicine Subjective





- Subjective


Service Date: 20


Patient seen and examined:: without staff


Patient is:: awake


Per staff patient has:: no adverse event, no episodes of fall





Internal Medicine Objective





- Results


Result Diagrams: 


 20 09:49





 20 09:49


Recent Labs: 


 Laboratory Last Values











WBC  6.9 K/uL (4.8-10.8)   20  09:49    


 


RBC  5.34 MIL/uL (4.2-6.2)   20  09:49    


 


Hgb  13.8 gm/dL (12-16)   20  09:49    


 


Hct  43.3 % (41.0-60)   20  09:49    


 


MCV  81.1 fl ()   20  09:49    


 


MCH  25.9 pg (27.0-31.0)  L  20  09:49    


 


MCHC Differential  31.9 pg (28.0-36.0)   20  09:49    


 


RDW  15.0 % (11.5-20.0)   20  09:49    


 


Plt Count  255 Th/cmm (150-400)   20  09:49    


 


MPV  8.4 fl  20  09:49    


 


Neutrophils %  55.4 % (40.0-80.0)   20  09:49    


 


Lymphocytes %  38.9 % (20.0-50.0)   20  09:49    


 


Monocytes %  3.7 % (2.0-10.0)   20  09:49    


 


Eosinophils %  1.6 % (0.0-5.0)   20  09:49    


 


Basophils %  0.4 % (0.0-2.0)   20  09:49    


 


Sodium  141 mmol/L (136-145)   20  09:49    


 


Potassium  4.1 mmol/L (3.5-5.1)   20  09:49    


 


Chloride  105 mmol/L ()   20  09:49    


 


Carbon Dioxide  30 mmol/L (23-29)  H  20  09:49    


 


Anion Gap  10  (5-15)   20  09:49    


 


BUN  20 mg/dL (8-21)   20  09:49    


 


Creatinine  0.84 mg/dL (0.70-1.30)   20  09:49    


 


Glucose  134 mg/dL (70-99)  H  20  09:49    


 


Calcium  9.6 mg/dL (8.4-10.2)   20  09:49    


 


Total Bilirubin  0.6 mg/dL (0.0-1.0)   20  09:49    


 


AST  12 U/L (10-37)   20  09:49    


 


ALT  17 U/L (12-78)   20  09:49    


 


Alkaline Phosphatase  69 U/L ()   20  09:49    


 


Total Protein  7.2 g/dL (6.4-8.3)   20  09:49    


 


Albumin  3.7 g/dL (3.4-5.0)   20  09:49    


 


Triglycerides  216 mg/dL (<150)  H  20  12:11    


 


Cholesterol  313 mg/dL (<200)  H  20  12:11    


 


LDL Cholesterol Direct  216 mg/dL ()  H  20  12:11    


 


HDL Cholesterol  49 mg/dL (23-92)   20  12:11    














- Physical Exam


Vitals and I&O: 


 Vital Signs











Temp  97.4 F   20 14:00


 


Pulse  57   20 14:00


 


Resp  18   20 14:00


 


BP  166/77   20 14:00


 


Pulse Ox  97   20 14:00








 Intake & Output











 20





 18:59 06:59 18:59


 


Intake Total 600 240 


 


Balance 600 240 


 


Intake:   


 


  Oral 600 240 


 


Other:   


 


  # Voids 3 2 


 


  # Bowel Movements 1  











Active Medications: 


Current Medications





Acetaminophen (Tylenol)  650 mg PO Q4H PRN


   PRN Reason: Pain (Mild 1-3)


   Stop: 20 17:51


   Last Admin: 20 16:06 Dose:  650 mg


Al Hydrox/Mg Hydrox/Simethicone (Maalox)  30 ml PO Q4HR PRN


   PRN Reason: GI DISTRESS


   Stop: 20 17:51


Atorvastatin Calcium (Lipitor)  20 mg PO HS [facility]ARD


   Stop: 20 20:59


   Last Admin: 20 21:21 Dose:  20 mg


Donepezil HCl (Aricept)  5 mg PO DAILY [facility]


   Stop: 20 08:59


   Last Admin: 20 08:38 Dose:  5 mg


Escitalopram Oxalate (Lexapro)  5 mg PO DAILY [facility]ARD; Protocol


   Stop: 20 08:59


   Last Admin: 20 08:38 Dose:  5 mg


Lorazepam (Ativan)  0.5 mg PO Q4HR PRN; Protocol


   PRN Reason: Anxiety


   Stop: 20 17:51


   Last Admin: 20 16:52 Dose:  0.5 mg


Losartan Potassium (Cozaar)  50 mg PO DAILY [facility]


   Stop: 20 08:59


   Last Admin: 20 08:38 Dose:  50 mg


Magnesium Hydroxide (Milk Of Magnesia)  30 ml PO HS PRN


   PRN Reason: Constipation


Memantine (Namenda)  5 mg PO BID [facility]ARD


   Stop: 08/15/20 16:59


   Last Admin: 20 16:22 Dose:  5 mg


Metoprolol Tartrate (Lopressor)  50 mg PO Q12HR [facility]


   Stop: 20 20:59


   Last Admin: 20 08:39 Dose:  50 mg


Multivitamins/Vitamin C (Theragran)  1 tab PO DAILY [facility]


   Stop: 20 08:59


   Last Admin: 20 08:39 Dose:  1 tab


Quetiapine Fumarate (Seroquel)  12.5 mg PO HS [facility]; Protocol


   Stop: 20 20:59


   Last Admin: 20 21:21 Dose:  12.5 mg








General: weak


HEENT: NC/AT, PERRLA


Neck: Supple, No JVD


Lungs: CTAB


Cardiovascular: RRR, Normal S1, Normal S2


Abdomen: soft, non-tender


Extremities: clear


Neurological: no change





Internal Medicine Assmt/Plan





- Assessment


Assessment: 





1.  HTN


2.  HLD


3.  Dementia





- Plan


Plan: 





continue cozaar and lopressor


BP still slightly labile


will continue to monitor


continue supportive care


reviewed medical records


d/w r.n.





Nutritional Asmnt/Malnutr-PDOC





- Dietary Evaluation


Malnutrition Findings (Please click <Entered> for more info): 








Nutritional Asmnt/Malnutrition                             Start:  20 11:

47


Text:                                                      Status: Complete    

  


Freq:                                                                          

  


Protocol:                                                                      

  


 Document     20 11:47  GRECIA  (Rec: 20 11:49  GRECIA [email or web address])


 Nutritional Asmnt/Malnutrition


     Patient General Information


      Nutritional Screening                      Moderate Risk


      Diagnosis                                  Acute Psychosis associated


                                                 with Advanced Dementia


      Pertinent Medical Hx/Surgical Hx           HTN, Hyperlipidemia, Dementia


      Subjective Information                     Pt is a 85-year-old female


                                                 admitted from living by


                                                 herself on  d/t acute


                                                 psychosis associated with


                                                 advanced dementia. Pt is


                                                 eating an estimated 100% of


                                                 meals Per Meal/Nutrition


                                                 Activity Record. Dietary is


                                                 currently providing an


                                                 estimated 2150 kcals and 108


                                                 gm Pro to meet 100+% kcal and


                                                 100+% Pro needs.


                                                 Anthropometrics


                                                 HT: 53


                                                 WT: 144 LB (65.45 kg)


                                                 BMI: 25.58 (Overweight)


                                                 GI/ Skin Integrity


                                                 GI: WNL, Soft, Non-tender


                                                 BM: 6/15 x1


                                                 I/O: 1240/Not Noted


                                                 Skin: WNL, Intact


                                                 Navid: 21


                                                 Diet Order: Cardiac


                                                 Estimated Energy Needs: (


                                                 Geriatric, CBW)


                                                 4097-3694 kcals (25-30 kcals/


                                                 kg)


                                                 65-80g Pro (1.0-1.2 g/kg)


                                                 5532-1289 ml (25-30 ml/kg)


      Current Diet Order/ Nutrition Support      Cardiac


      Pertinent Medications                      Maalox, Lipitor, Valium,


                                                 Cozaar, MOM (PRN), Theragran


      Pertinent Labs                             : Glucose 134, Cholesterol


                                                 313, Triglycerides 216, LDL


                                                 216


                                                 : Glucose 119, Urine


                                                 Ketones- trace


     Nutritional Hx/Data


      Height                                     1.6 m


      Height (Calculated Centimeters)            160.0


      Current Weight (lbs)                       65.317 kg


      Weight (Calculated Kilograms)              65.3


      Weight (Calculated Grams)                  06701.3


      Ideal Body Weight                          115 LB (52.27 kg)


      % Ideal Body Weight                        125


      Body Mass Index (BMI)                      25.4


      Weight Status                              Overweight


     GI Symptoms


      Last BM                                    6/15 x1


      Skin Integrity/Comment:                    Skin: WNL, Intact


                                                 Navid: 21


      Current %PO                                Good (%)


     Estimated Nutritional Goals


      BEE in Kcals:                              Using Current wt


      Calories/Kcals/Kg                          25-30


      Kcals Calculated                           8715-8365


      Protein:                                   Using Current wt


      Protein g/k.0-1.2


      Protein Calculated                         65-80


      Fluid: ml                                  9799-2912 ml (25-30 ml/kg)


     Nutritional Problem


      No current Nutrition Prob


       Problem                                   No nutrition diagnosis at this


                                                 time.


       Etiology                                  N/A


       Signs/Symptoms:                           N/A


     Malnutrition Related to Morbid Obesity


      Malnutrition related to morbid obesity     No


     Intervention/Recommendation


      Comments                                   Continue Cardiac diet as


                                                 tolerated.


     Expected Outcomes/Goals


      Expected Outcomes/Goals                    1.PO intake to continue to


                                                 meet >75% of estimated


                                                 nutritional needs.


                                                 2.Monitor PO intake, wt,


                                                 nutrition related labs, and


                                                 skin integrity.


                                                 3.F/U as low risk in 7-10 days


                                                 , -.

## 2020-06-19 NOTE — PROGRESS NOTES
DATE:  06/19/2020



SUBJECTIVE:  Staff was spoken to.  The patient is interviewed.  The patient is

isolative and withdrawn.  The patient is still having difficult time to cope

with the stress.  The patient is still confused, but not presenting with any

threats to harm self or others.  The patient's sleep is noted to be poor. 

Appetite seems to be improving.  No side effects to the medications are noted. 

The patient is being closely monitored with a lower dose of the Lexapro and the

patient is reported to have been accepted at [facility] and

have been trying to coordinate the care with the family.



ASSESSMENT:  The patient is still demented and depressed.



PLAN:  To continue the patient with the supportive therapy and followup.





DD: 06/19/2020 08:39

DT: 06/19/2020 19:09

JOB# [id number]  [id number]

## 2020-06-20 RX ADMIN — ESCITALOPRAM SCH MG: 5 TABLET, FILM COATED ORAL at 08:51

## 2020-06-20 NOTE — PROGRESS NOTES
DATE:  06/20/2020



PSYCHIATRIC PROGRESS NOTE



SUBJECTIVE:  Staff was spoken to.  The patient is interviewed.  Mood is noted to

be anxious.  The patient is isolative and withdrawn.  The patient is confused

and pacing most of the time on the unit.  Insight and judgment are noted to be

still impaired.  Impulse control seems to be improving.  The patient has no

place to return to.



PLAN:  To continue the patient with the current medications.  I encouraged the

patient to verbalize the concerns rather than to act out.  The patient is

currently on the Lexapro.  Plan to continue the patient on followup.





DD: 06/20/2020 11:53

DT: 06/20/2020 16:47

JOB# [id number]  [id number]

## 2020-06-20 NOTE — PROGRESS NOTES
DATE:     06/20/2020



PSYCHOLOGY PROGRESS NOTE



SUBJECTIVE:  The patient is seen in the activity/dining area.  The patient has

her head in her hands and is tearful.  Case has been discussed with staff.  The

patient seems to be withdrawn as well as depressed.  The patient is confused and

not understanding why she is continuing to be hospitalized.  The patient is

confused about whether she is returning home or going to a placement.  The

patient was unable to clearly verbalize reasons for her depression and her

tearfulness.  Staff indicates the patient has been accepted at [facility].



OBJECTIVE:  Mood is depressed.  Affect is labile and tearful.  Thought process

shows to be confused.  The patient denied any hallucinations or delusions.  The

patient's behavior has been withdrawn and isolative.



ASSESSMENT:  The patient's depression and dementia persist.  The patient has

been accepted at [facility].



PLAN:  As above.  The  has been trying to coordinate the discharge

and placement with the family.  We provided supportive therapy and included

coping strategies for phase of life issues as well as adjustment for the

patient's placement in a facility and the loss of her independence with respect

to her independent lifestyle.  We also provided adjustment to the patient's

limitations and restrictions that she will be experiencing at her new placement.

We encouraged the patient to continue the dialogue with the psychologist and the

psychiatrist at her placement.  We will follow up in 2 days if the patient is

still admitted on the unit to continue the present treatment.





DD: 06/20/2020 14:58

DT: 06/20/2020 19:06

JOB# [id number]  [id number]

LETI

## 2020-06-21 RX ADMIN — ESCITALOPRAM SCH MG: 5 TABLET, FILM COATED ORAL at 08:30

## 2020-06-21 NOTE — PROGRESS NOTES
DATE:  06/21/2020



SUBJECTIVE:  Staff was spoken to.  The patient is interviewed.  Mood is noted to

be anxious.  The patient is pacing on the unit.  The patient is not presenting

with any threats to harm self or others.  Insight and judgment at this time are

noted to be fair.  Impulse control seems to be fair.  However, the patient has

been having problem with the memory.



ASSESSMENT:  The patient is stabilizing.



PLAN:  To discharge the patient possibly tomorrow for followup on outpatient

basis.





DD: 06/21/2020 12:26

DT: 06/21/2020 15:06

JOB# [id number]  [id number]

## 2020-06-22 RX ADMIN — ESCITALOPRAM SCH MG: 5 TABLET, FILM COATED ORAL at 08:56

## 2020-06-22 NOTE — INTERNAL MEDICINE PROG NOTE
Internal Medicine Subjective





- Subjective


Service Date: 20


Patient seen and examined:: with staff


Patient is:: awake


Per staff patient has:: no adverse event, no episodes of fall





Internal Medicine Objective





- Results


Result Diagrams: 


 20 09:49





 20 09:49


Recent Labs: 


 Laboratory Last Values











WBC  6.9 K/uL (4.8-10.8)   20  09:49    


 


RBC  5.34 MIL/uL (4.2-6.2)   20  09:49    


 


Hgb  13.8 gm/dL (12-16)   20  09:49    


 


Hct  43.3 % (41.0-60)   20  09:49    


 


MCV  81.1 fl ()   20  09:49    


 


MCH  25.9 pg (27.0-31.0)  L  20  09:49    


 


MCHC Differential  31.9 pg (28.0-36.0)   20  09:49    


 


RDW  15.0 % (11.5-20.0)   20  09:49    


 


Plt Count  255 Th/cmm (150-400)   20  09:49    


 


MPV  8.4 fl  20  09:49    


 


Neutrophils %  55.4 % (40.0-80.0)   20  09:49    


 


Lymphocytes %  38.9 % (20.0-50.0)   20  09:49    


 


Monocytes %  3.7 % (2.0-10.0)   20  09:49    


 


Eosinophils %  1.6 % (0.0-5.0)   20  09:49    


 


Basophils %  0.4 % (0.0-2.0)   20  09:49    


 


Sodium  141 mmol/L (136-145)   20  09:49    


 


Potassium  4.1 mmol/L (3.5-5.1)   20  09:49    


 


Chloride  105 mmol/L ()   20  09:49    


 


Carbon Dioxide  30 mmol/L (23-29)  H  20  09:49    


 


Anion Gap  10  (5-15)   20  09:49    


 


BUN  20 mg/dL (8-21)   20  09:49    


 


Creatinine  0.84 mg/dL (0.70-1.30)   20  09:49    


 


Glucose  134 mg/dL (70-99)  H  20  09:49    


 


Calcium  9.6 mg/dL (8.4-10.2)   20  09:49    


 


Total Bilirubin  0.6 mg/dL (0.0-1.0)   20  09:49    


 


AST  12 U/L (10-37)   20  09:49    


 


ALT  17 U/L (12-78)   20  09:49    


 


Alkaline Phosphatase  69 U/L ()   20  09:49    


 


Total Protein  7.2 g/dL (6.4-8.3)   20  09:49    


 


Albumin  3.7 g/dL (3.4-5.0)   20  09:49    


 


Triglycerides  216 mg/dL (<150)  H  20  12:11    


 


Cholesterol  313 mg/dL (<200)  H  20  12:11    


 


LDL Cholesterol Direct  216 mg/dL ()  H  20  12:11    


 


HDL Cholesterol  49 mg/dL (23-92)   20  12:11    


 


Coronavirus (PCR)  Negative  (Negative)   20  15:35    














- Physical Exam


Vitals and I&O: 


 Vital Signs











Temp  97.9 F   20 14:09


 


Pulse  54   20 14:09


 


Resp  20   20 14:09


 


BP  144/56   20 14:09


 


Pulse Ox  97   20 14:09








 Intake & Output











 20





 18:59 06:59 18:59


 


Intake Total 950 360 


 


Balance 950 360 


 


Intake:   


 


  Oral 950 360 


 


Other:   


 


  # Voids 3 1 


 


  # Bowel Movements 0 0 











Active Medications: 


Current Medications





Acetaminophen (Tylenol)  650 mg PO Q4H PRN


   PRN Reason: Pain (Mild 1-3)


   Stop: 20 17:51


   Last Admin: 20 22:59 Dose:  650 mg


Al Hydrox/Mg Hydrox/Simethicone (Maalox)  30 ml PO Q4HR PRN


   PRN Reason: GI DISTRESS


   Stop: 20 17:51


Atorvastatin Calcium (Lipitor)  20 mg PO HS [facility]


   Stop: 20 20:59


   Last Admin: 20 20:40 Dose:  20 mg


Donepezil HCl (Aricept)  5 mg PO DAILY [facility]


   Stop: 20 08:59


   Last Admin: 20 08:57 Dose:  5 mg


Escitalopram Oxalate (Lexapro)  5 mg PO DAILY [facility]; Protocol


   Stop: 20 08:59


   Last Admin: 20 08:56 Dose:  5 mg


Losartan Potassium (Cozaar)  50 mg PO DAILY [facility]


   Stop: 20 08:59


   Last Admin: 20 09:54 Dose:  50 mg


Magnesium Hydroxide (Milk Of Magnesia)  30 ml PO HS PRN


   PRN Reason: Constipation


Memantine (Namenda)  5 mg PO BID [facility]


   Stop: 08/15/20 16:59


   Last Admin: 20 08:58 Dose:  5 mg


Metoprolol Tartrate (Lopressor)  50 mg PO Q12HR [facility]


   Stop: 20 20:59


   Last Admin: 20 08:57 Dose:  50 mg


Multivitamins/Vitamin C (Theragran)  1 tab PO DAILY [facility]


   Stop: 20 08:59


   Last Admin: 20 08:57 Dose:  1 tab


Quetiapine Fumarate (Seroquel)  12.5 mg PO HS [facility]; Protocol


   Stop: 20 20:59


   Last Admin: 20 20:40 Dose:  12.5 mg








General: weak


HEENT: NC/AT, PERRLA


Neck: Supple, No JVD


Lungs: CTAB


Cardiovascular: RRR, Normal S1, Normal S2


Abdomen: soft, non-tender


Extremities: clear


Neurological: no change





Internal Medicine Assmt/Plan





- Assessment


Assessment: 





1.  HTN


2.  HLD


3.  Dementia





- Plan


Plan: 





continue cozaar and lopressor


BP still slightly labile


will continue to monitor


continue supportive care


reviewed medical records


d/w r.n.





Nutritional Asmnt/Malnutr-PDOC





- Dietary Evaluation


Malnutrition Findings (Please click <Entered> for more info): 








Nutritional Asmnt/Malnutrition                             Start:  20 11:

47


Text:                                                      Status: Complete    

  


Freq:                                                                          

  


Protocol:                                                                      

  


 Document     20 11:47  GRECIA  (Rec: 20 11:49  [email or web address]  [id number])


 Nutritional Asmnt/Malnutrition


     Patient General Information


      Nutritional Screening                      Moderate Risk


      Diagnosis                                  Acute Psychosis associated


                                                 with Advanced Dementia


      Pertinent Medical Hx/Surgical Hx           HTN, Hyperlipidemia, Dementia


      Subjective Information                     Pt is a 85-year-old female


                                                 admitted from living by


                                                 herself on  d/t acute


                                                 psychosis associated with


                                                 advanced dementia. Pt is


                                                 eating an estimated 100% of


                                                 meals Per Meal/Nutrition


                                                 Activity Record. Dietary is


                                                 currently providing an


                                                 estimated 2150 kcals and 108


                                                 gm Pro to meet 100+% kcal and


                                                 100+% Pro needs.


                                                 Anthropometrics


                                                 HT: 53


                                                 WT: 144 LB (65.45 kg)


                                                 BMI: 25.58 (Overweight)


                                                 GI/ Skin Integrity


                                                 GI: WNL, Soft, Non-tender


                                                 BM: 6/15 x1


                                                 I/O: 1240/Not Noted


                                                 Skin: WNL, Intact


                                                 Navid: 21


                                                 Diet Order: Cardiac


                                                 Estimated Energy Needs: (


                                                 Geriatric, CBW)


                                                 7967-1289 kcals (25-30 kcals/


                                                 kg)


                                                 65-80g Pro (1.0-1.2 g/kg)


                                                 8496-9876 ml (25-30 ml/kg)


      Current Diet Order/ Nutrition Support      Cardiac


      Pertinent Medications                      Maalox, Lipitor, Valium,


                                                 Cozaar, MOM (PRN), Theragran


      Pertinent Labs                             : Glucose 134, Cholesterol


                                                 313, Triglycerides 216, LDL


                                                 216


                                                 : Glucose 119, Urine


                                                 Ketones- trace


     Nutritional Hx/Data


      Height                                     1.6 m


      Height (Calculated Centimeters)            160.0


      Current Weight (lbs)                       65.317 kg


      Weight (Calculated Kilograms)              65.3


      Weight (Calculated Grams)                  70944.3


      Ideal Body Weight                          115 LB (52.27 kg)


      % Ideal Body Weight                        125


      Body Mass Index (BMI)                      25.4


      Weight Status                              Overweight


     GI Symptoms


      Last BM                                    6/15 x1


      Skin Integrity/Comment:                    Skin: WNL, Intact


                                                 Navid: 21


      Current %PO                                Good (%)


     Estimated Nutritional Goals


      BEE in Kcals:                              Using Current wt


      Calories/Kcals/Kg                          25-30


      Kcals Calculated                           5399-0613


      Protein:                                   Using Current wt


      Protein g/k.0-1.2


      Protein Calculated                         65-80


      Fluid: ml                                  9237-3436 ml (25-30 ml/kg)


     Nutritional Problem


      No current Nutrition Prob


       Problem                                   No nutrition diagnosis at this


                                                 time.


       Etiology                                  N/A


       Signs/Symptoms:                           N/A


     Malnutrition Related to Morbid Obesity


      Malnutrition related to morbid obesity     No


     Intervention/Recommendation


      Comments                                   Continue Cardiac diet as


                                                 tolerated.


     Expected Outcomes/Goals


      Expected Outcomes/Goals                    1.PO intake to continue to


                                                 meet >75% of estimated


                                                 nutritional needs.


                                                 2.Monitor PO intake, wt,


                                                 nutrition related labs, and


                                                 skin integrity.


                                                 3.F/U as low risk in 7-10 days


                                                 , -.

## 2020-06-22 NOTE — DISCHARGE SUMMARY
DATE OF DISCHARGE:  06/22/2020



IDENTIFYING DATA:  The patient is an 85-year-old  woman living by

herself.



JUSTIFICATION OF HOSPITALIZATION:  The patient is admitted on a voluntary basis

in view of her acute psychosis, confusion and disorientation.



CHIEF COMPLAINT:  "I don't know, they brought me in here.



DIAGNOSES AT THE TIME OF ADMISSION:

AXIS I:

1.  Unspecified psychosis.

2:  Dementia and behavioral changes secondary to dementia.

AXIS II:  None.

AXIS III:  Hyperlipidemia and hypertension.



HOSPITAL COURSE AND RESPONSE TO TREATMENT:  The patient has been observed on

inpatient unit, provided with supportive psychotherapy.  The patient has been

seen by Dr. Prasad and Dr. Carolina for medical workup.  Blood work done at the

time of hospitalization is noted to be within normal limits.  No major

intervention was needed.  The patient has been closely monitored on the unit. 

Provided with supportive psychotherapy.  The patient has been placed on

escitalopram 5 mg for her depression and the patient was given the donepezil and

memantine and also has been placed on 12.5 mg of the Seroquel at bedtime and the

patient started to do fairly well.  The paranoia subsided.  The patient started

to make some sense, but the patient has no place to return to and hence the

patient has been finally discharged to [facility] for further followup.



MENTAL STATUS EXAMINATION AT THE TIME OF DISCHARGE:  The patient's mood is noted

to be less irritable.  Affect is appropriate.  Not suicidal or homicidal. 

Insight and judgment are noted to be improving.  Impulse control is noted to be

fair.  The patient is not presenting with any threats to harm self or others and

the patient is willing to comply with the treatment on an outpatient basis.



PROGNOSIS:  At the time of discharge is noted to be fair with the treatment.





DD: 06/22/2020 08:55

DT: 06/22/2020 09:51

JOB# [id number]  [id number]

## 2020-06-23 NOTE — PROGRESS NOTES
DATE:     06/20/2020



PSYCHOLOGY PROGRESS NOTE



SUBJECTIVE:  The patient is seen in her room and interviewed.  Case is discussed

with staff.



Staff reports the patient seems to be doing fairly well.  The patient is

compliant with her medication.  The patient continues to seem somewhat confused

about her circumstances, but is following through with her treatment here on the

unit.



OBJECTIVE:  Mood is improving and is less irritable.  Affect is mood congruent. 

The patient denied any suicidal thoughts, plan or intention.  The patient denies

any hallucinations or delusions.  The patient is answering questions relevantly

at times with periods of confusion.  The patient has been compliant with

treatment according to the staff.



ASSESSMENT:  The patient seems to be approaching baseline and is stabilizing.



PLAN:  We provided supportive psychotherapy including coping strategies for

phase of life issues.  We provided adjustment for placement in a skilled nursing

facility.  We provided adjustment for the patient to understand her physical and

functional limitations and restrictions and the need for placement versus

returning home.  We provided coping strategies to assist the patient in reducing

her depression.  We provided reality integration as well.  The patient seems to

be accepting the current treatment recommendations and discharge plans.  No

followup is indicated as the patient is most likely discharging today.



Thank you, Dr. Wynne for this consult and the opportunity to participate

with you in this patient's care.





DD: 06/23/2020 10:13

DT: 06/23/2020 11:07

[facility]# [id number]  [id number]

LETI